# Patient Record
Sex: MALE | Race: WHITE
[De-identification: names, ages, dates, MRNs, and addresses within clinical notes are randomized per-mention and may not be internally consistent; named-entity substitution may affect disease eponyms.]

---

## 2020-08-23 ENCOUNTER — HOSPITAL ENCOUNTER (OUTPATIENT)
Dept: HOSPITAL 19 - COL.ER | Age: 25
Setting detail: OBSERVATION
Discharge: HOME | End: 2020-08-23
Attending: UROLOGY | Admitting: UROLOGY
Payer: COMMERCIAL

## 2020-08-23 VITALS — SYSTOLIC BLOOD PRESSURE: 138 MMHG | DIASTOLIC BLOOD PRESSURE: 75 MMHG | HEART RATE: 75 BPM

## 2020-08-23 VITALS — BODY MASS INDEX: 34.92 KG/M2 | WEIGHT: 230.38 LBS | HEIGHT: 67.99 IN

## 2020-08-23 VITALS — SYSTOLIC BLOOD PRESSURE: 146 MMHG | HEART RATE: 78 BPM | DIASTOLIC BLOOD PRESSURE: 83 MMHG

## 2020-08-23 VITALS — HEART RATE: 70 BPM | SYSTOLIC BLOOD PRESSURE: 132 MMHG | DIASTOLIC BLOOD PRESSURE: 69 MMHG

## 2020-08-23 VITALS — HEART RATE: 73 BPM | SYSTOLIC BLOOD PRESSURE: 133 MMHG | DIASTOLIC BLOOD PRESSURE: 73 MMHG

## 2020-08-23 VITALS — TEMPERATURE: 99.2 F

## 2020-08-23 DIAGNOSIS — N20.1: Primary | ICD-10-CM

## 2020-08-23 DIAGNOSIS — Z88.2: ICD-10-CM

## 2020-08-23 DIAGNOSIS — Z88.0: ICD-10-CM

## 2020-08-23 LAB
ALBUMIN SERPL-MCNC: 4.3 GM/DL (ref 3.5–5)
ALP SERPL-CCNC: 91 U/L (ref 50–136)
ALT SERPL-CCNC: 22 U/L (ref 4–49)
ANION GAP SERPL CALC-SCNC: 10 MMOL/L (ref 7–16)
AST SERPL-CCNC: 22 U/L (ref 15–37)
BASOPHILS # BLD: 0.1 10*3/UL (ref 0–0.2)
BASOPHILS NFR BLD AUTO: 0.6 % (ref 0–2)
BILIRUB SERPL-MCNC: 0.9 MG/DL (ref 0–1)
BUN SERPL-MCNC: 17 MG/DL (ref 9–20)
CALCIUM SERPL-MCNC: 9.1 MG/DL (ref 8.4–10.2)
CHLORIDE SERPL-SCNC: 104 MMOL/L (ref 98–107)
CO2 SERPL-SCNC: 22 MMOL/L (ref 22–30)
CREAT SERPL-SCNC: 1.24 UMOL/L (ref 0.66–1.25)
CRP SERPL-MCNC: 1.9 MG/DL (ref 0–0.9)
EOSINOPHIL # BLD: 0.3 10*3/UL (ref 0–0.7)
EOSINOPHIL NFR BLD: 2.3 % (ref 0–4)
ERYTHROCYTE [DISTWIDTH] IN BLOOD BY AUTOMATED COUNT: 12.1 % (ref 11.5–14.5)
GLUCOSE SERPL-MCNC: 101 MG/DL (ref 74–106)
GRANULOCYTES # BLD AUTO: 74.8 % (ref 42.2–75.2)
HCT VFR BLD AUTO: 46.5 % (ref 42–52)
HGB BLD-MCNC: 16 G/DL (ref 13.5–18)
LIPASE SERPL-CCNC: 58 U/L (ref 23–300)
LYMPHOCYTES # BLD: 1.7 10*3/UL (ref 1.2–3.4)
LYMPHOCYTES NFR BLD: 12.9 % (ref 20–51)
MCH RBC QN AUTO: 29 PG (ref 27–31)
MCHC RBC AUTO-ENTMCNC: 34 G/DL (ref 33–37)
MCV RBC AUTO: 84 FL (ref 80–100)
MONOCYTES # BLD: 1.2 10*3/UL (ref 0.1–0.6)
MONOCYTES NFR BLD AUTO: 9 % (ref 1.7–9.3)
NEUTROPHILS # BLD: 9.7 10*3/UL (ref 1.4–6.5)
PH UR STRIP.AUTO: 5 [PH] (ref 5–8)
PLATELET # BLD AUTO: 244 K/MM3 (ref 130–400)
PMV BLD AUTO: 9.2 FL (ref 7.4–10.4)
POTASSIUM SERPL-SCNC: 4.2 MMOL/L (ref 3.4–5)
PROT SERPL-MCNC: 7.6 GM/DL (ref 6.4–8.2)
RBC # BLD AUTO: 5.51 M/MM3 (ref 4.2–5.6)
RBC # UR STRIP.AUTO: (no result) /UL
RBC # UR: (no result) /HPF
SODIUM SERPL-SCNC: 136 MMOL/L (ref 137–145)
SP GR UR STRIP.AUTO: 1.02 (ref 1–1.03)
URN COLLECT METHOD CLASS: (no result)

## 2020-08-23 NOTE — NUR
Patient to room 348 post op, report from [facility] nurse. Patient did well
post op. Vital stable.  He reports mild pain, denies the need for pain
medication. He tolerated a light lunch, no Nausea. He voided without
difficulty.  Some blood noted. Patient ready for discharge, all discharge
instructions given. patient sent with script for norco. medication safety
reviewed. Patient to make a follow up with urology tmrw morning. We reviewed
diet & activity. Patient & wife deny questions or concerns. Patient ambulated
out with all belogings.

## 2020-09-22 ENCOUNTER — HOSPITAL ENCOUNTER (EMERGENCY)
Dept: HOSPITAL 19 - COL.ER | Age: 25
Discharge: HOME | End: 2020-09-22
Payer: COMMERCIAL

## 2020-09-22 VITALS — HEART RATE: 90 BPM

## 2020-09-22 VITALS — DIASTOLIC BLOOD PRESSURE: 96 MMHG | SYSTOLIC BLOOD PRESSURE: 164 MMHG | TEMPERATURE: 98.3 F

## 2020-09-22 VITALS — HEIGHT: 67.99 IN | BODY MASS INDEX: 37.96 KG/M2 | WEIGHT: 250.45 LBS

## 2020-09-22 DIAGNOSIS — Z88.2: ICD-10-CM

## 2020-09-22 DIAGNOSIS — S62.001K: Primary | ICD-10-CM

## 2020-09-22 DIAGNOSIS — Z88.0: ICD-10-CM

## 2021-06-04 ENCOUNTER — HOSPITAL ENCOUNTER (OUTPATIENT)
Dept: HOSPITAL 19 - SDCO | Age: 26
Discharge: HOME | End: 2021-06-04
Attending: UROLOGY
Payer: COMMERCIAL

## 2021-06-04 VITALS — TEMPERATURE: 98.4 F | DIASTOLIC BLOOD PRESSURE: 76 MMHG | HEART RATE: 83 BPM | SYSTOLIC BLOOD PRESSURE: 130 MMHG

## 2021-06-04 VITALS — DIASTOLIC BLOOD PRESSURE: 81 MMHG | HEART RATE: 73 BPM | SYSTOLIC BLOOD PRESSURE: 141 MMHG

## 2021-06-04 VITALS — DIASTOLIC BLOOD PRESSURE: 72 MMHG | HEART RATE: 80 BPM | SYSTOLIC BLOOD PRESSURE: 141 MMHG

## 2021-06-04 VITALS — SYSTOLIC BLOOD PRESSURE: 128 MMHG | DIASTOLIC BLOOD PRESSURE: 87 MMHG | HEART RATE: 88 BPM | TEMPERATURE: 97.9 F

## 2021-06-04 VITALS — HEIGHT: 68 IN | WEIGHT: 250.45 LBS | BODY MASS INDEX: 37.96 KG/M2

## 2021-06-04 VITALS — HEART RATE: 79 BPM | DIASTOLIC BLOOD PRESSURE: 78 MMHG | SYSTOLIC BLOOD PRESSURE: 144 MMHG

## 2021-06-04 VITALS — HEART RATE: 80 BPM | SYSTOLIC BLOOD PRESSURE: 140 MMHG | DIASTOLIC BLOOD PRESSURE: 75 MMHG

## 2021-06-04 DIAGNOSIS — N20.2: Primary | ICD-10-CM

## 2021-06-04 DIAGNOSIS — Z79.899: ICD-10-CM

## 2021-06-04 PROCEDURE — C2617 STENT, NON-COR, TEM W/O DEL: HCPCS

## 2021-06-04 PROCEDURE — C1769 GUIDE WIRE: HCPCS

## 2021-06-04 NOTE — NUR
Pt rating left flank pain 7/10. Kg CRNA notified and orders given. Dilaudid
0.5mg IVSP given per orders.

## 2021-06-04 NOTE — NUR
Pt. sitting up in bed. Pt. is a&OX3, assessment complete.  INT to rt. ac
patent.  Pt. denies pain.  Pt. has met discharge criteria.  Reviewed discharge
paperwork, education, and scripts with pt.  Pt. voices understanding.  INT
discontinued from rt. ac.  Pt. getting dressed will call when ready to be
escorted out.

## 2021-06-13 ENCOUNTER — HOSPITAL ENCOUNTER (EMERGENCY)
Dept: HOSPITAL 19 - COL.ER | Age: 26
Discharge: HOME | End: 2021-06-13
Attending: PERSONAL EMERGENCY RESPONSE ATTENDANT
Payer: COMMERCIAL

## 2021-06-13 VITALS — TEMPERATURE: 98.1 F

## 2021-06-13 VITALS — DIASTOLIC BLOOD PRESSURE: 70 MMHG | HEART RATE: 80 BPM | SYSTOLIC BLOOD PRESSURE: 129 MMHG

## 2021-06-13 VITALS — BODY MASS INDEX: 37.96 KG/M2 | WEIGHT: 250.45 LBS | HEIGHT: 67.99 IN

## 2021-06-13 DIAGNOSIS — N39.0: Primary | ICD-10-CM

## 2021-06-13 DIAGNOSIS — Z88.0: ICD-10-CM

## 2021-06-13 DIAGNOSIS — Z88.1: ICD-10-CM

## 2021-06-13 DIAGNOSIS — Z88.2: ICD-10-CM

## 2021-06-13 DIAGNOSIS — Z87.442: ICD-10-CM

## 2021-06-13 LAB
ALBUMIN SERPL-MCNC: 4.5 GM/DL (ref 3.5–5)
ALP SERPL-CCNC: 86 U/L (ref 50–136)
ALT SERPL-CCNC: 30 U/L (ref 4–49)
ANION GAP SERPL CALC-SCNC: 9 MMOL/L (ref 7–16)
AST SERPL-CCNC: 23 U/L (ref 15–37)
BASOPHILS # BLD: 0.1 10*3/UL (ref 0–0.2)
BASOPHILS NFR BLD AUTO: 0.7 % (ref 0–2)
BILIRUB SERPL-MCNC: 0.8 MG/DL (ref 0–1)
BUN SERPL-MCNC: 15 MG/DL (ref 9–20)
CALCIUM SERPL-MCNC: 9.5 MG/DL (ref 8.4–10.2)
CHLORIDE SERPL-SCNC: 105 MMOL/L (ref 98–107)
CO2 SERPL-SCNC: 22 MMOL/L (ref 22–30)
CREAT SERPL-SCNC: 0.64 UMOL/L (ref 0.66–1.25)
CRP SERPL-MCNC: 2.4 MG/DL (ref 0–0.9)
EOSINOPHIL # BLD: 0.2 10*3/UL (ref 0–0.7)
EOSINOPHIL NFR BLD: 1.7 % (ref 0–4)
ERYTHROCYTE [DISTWIDTH] IN BLOOD BY AUTOMATED COUNT: 12.3 % (ref 11.5–14.5)
GLUCOSE SERPL-MCNC: 96 MG/DL (ref 74–106)
GRANULOCYTES # BLD AUTO: 69.7 % (ref 42.2–75.2)
HCT VFR BLD AUTO: 47.6 % (ref 42–52)
HGB BLD-MCNC: 16 G/DL (ref 13.5–18)
LIPASE SERPL-CCNC: 64 U/L (ref 23–300)
LYMPHOCYTES # BLD: 2.2 10*3/UL (ref 1.2–3.4)
LYMPHOCYTES NFR BLD: 18.9 % (ref 20–51)
MCH RBC QN AUTO: 29 PG (ref 27–31)
MCHC RBC AUTO-ENTMCNC: 34 G/DL (ref 33–37)
MCV RBC AUTO: 85 FL (ref 80–100)
MONOCYTES # BLD: 1 10*3/UL (ref 0.1–0.6)
MONOCYTES NFR BLD AUTO: 8.6 % (ref 1.7–9.3)
NEUTROPHILS # BLD: 7.9 10*3/UL (ref 1.4–6.5)
PH UR STRIP.AUTO: 5 [PH] (ref 5–8)
PLATELET # BLD AUTO: 302 K/MM3 (ref 130–400)
PMV BLD AUTO: 9.8 FL (ref 7.4–10.4)
POTASSIUM SERPL-SCNC: 4.4 MMOL/L (ref 3.4–5)
PROT SERPL-MCNC: 8.1 GM/DL (ref 6.4–8.2)
RBC # BLD AUTO: 5.62 M/MM3 (ref 4.2–5.6)
RBC # UR STRIP.AUTO: (no result) /UL
RBC # UR: >50 /HPF
SODIUM SERPL-SCNC: 136 MMOL/L (ref 137–145)
SP GR UR STRIP.AUTO: 1.02 (ref 1–1.03)
URN COLLECT METHOD CLASS: (no result)